# Patient Record
Sex: MALE | Race: BLACK OR AFRICAN AMERICAN | NOT HISPANIC OR LATINO | URBAN - METROPOLITAN AREA
[De-identification: names, ages, dates, MRNs, and addresses within clinical notes are randomized per-mention and may not be internally consistent; named-entity substitution may affect disease eponyms.]

---

## 2018-03-16 ENCOUNTER — EMERGENCY (EMERGENCY)
Facility: HOSPITAL | Age: 53
LOS: 0 days | Discharge: HOME | End: 2018-03-16
Attending: EMERGENCY MEDICINE | Admitting: EMERGENCY MEDICINE
Payer: OTHER MISCELLANEOUS

## 2018-03-16 VITALS — WEIGHT: 300.05 LBS | HEIGHT: 71 IN

## 2018-03-16 VITALS
DIASTOLIC BLOOD PRESSURE: 66 MMHG | OXYGEN SATURATION: 100 % | SYSTOLIC BLOOD PRESSURE: 132 MMHG | HEART RATE: 78 BPM | RESPIRATION RATE: 20 BRPM | TEMPERATURE: 98 F

## 2018-03-16 DIAGNOSIS — S09.90XA UNSPECIFIED INJURY OF HEAD, INITIAL ENCOUNTER: ICD-10-CM

## 2018-03-16 DIAGNOSIS — Z88.0 ALLERGY STATUS TO PENICILLIN: ICD-10-CM
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
## 2018-03-16 DIAGNOSIS — S09.8XXA OTHER SPECIFIED INJURIES OF HEAD, INITIAL ENCOUNTER: ICD-10-CM

## 2018-03-16 PROCEDURE — 99283 EMERGENCY DEPT VISIT LOW MDM: CPT

## 2018-03-16 RX ORDER — METFORMIN HYDROCHLORIDE 850 MG/1
0 TABLET ORAL
Qty: 0 | Refills: 0 | COMMUNITY

## 2018-03-16 RX ORDER — SPIRONOLACTONE 25 MG/1
0 TABLET, FILM COATED ORAL
Qty: 0 | Refills: 0 | COMMUNITY

## 2018-03-16 RX ORDER — METOPROLOL TARTRATE 50 MG
0 TABLET ORAL
Qty: 0 | Refills: 0 | COMMUNITY

## 2018-03-16 NOTE — ED ADULT NURSE NOTE - OBJECTIVE STATEMENT
pt was at work and a ratchet hit his head. Pt denies any pain, neg loc, no visual changes or headache.

## 2018-03-16 NOTE — ED STATDOCS - NEUROLOGICAL, MLM
sensation is normal and strength is normal. sensation is normal and strength is normal. +mild ttp over left front hematoma, no periorbital tenderness or swelling

## 2018-03-16 NOTE — ED STATDOCS - PROGRESS NOTE DETAILS
52 yr. old male PMH: HTN, Type 2 Diabetes presents to ED with injury to head while working at 8:30 am. Reports he was hit in left side of head with ratchet accidently. No LOC, No N/V. No headache. Not on any blood thinners.  No recent travel. Non smoker. Seen and examined by attending in Intake. Plan: Observe for 1 hr. then discharge Will F/U with patient and re evaluate. Donaldo CARMICHAEL

## 2018-03-16 NOTE — ED ADULT TRIAGE NOTE - CHIEF COMPLAINT QUOTE
hit self in head accidently with a ratchet at work. large hematoma to left forehead. patient denies blood thinners. patient denies LOC. no acute distress noted. ice in place.

## 2018-03-16 NOTE — ED STATDOCS - MEDICAL DECISION MAKING DETAILS
Plan ice pack and observe. Plan ice pack and observe.  After obs in ED pt cont to have minimal to no symptoms, no s/s of head injury requiring imaging. REturn precautions given to pt

## 2018-03-16 NOTE — ED STATDOCS - OBJECTIVE STATEMENT
53 y/o male with no pertinent pmhx presents to ED c/o head injury this morning at 8:30AM. States hitting self in head accidently with a ratchet at work. Denies blood thinners or LOC. No acute distress noted. Denies nausea and vomiting, fever/chills, HA, SOB, CP.

## 2021-10-11 NOTE — ED STATDOCS - NS_EDPROVIDERDISPOUSERTYPE_ED_A_ED
Called the patient and informed them of upcoming appointment. Patient was asked to arrive 20 minutes early in the ortho department to obtain x-rays.    Scribe Attestation (For Scribes USE Only)... Attending Attestation (For Attendings USE Only).../Scribe Attestation (For Scribes USE Only)...